# Patient Record
Sex: MALE | Race: WHITE | HISPANIC OR LATINO | Employment: FULL TIME | URBAN - METROPOLITAN AREA
[De-identification: names, ages, dates, MRNs, and addresses within clinical notes are randomized per-mention and may not be internally consistent; named-entity substitution may affect disease eponyms.]

---
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
## 2023-04-13 ENCOUNTER — HOSPITAL ENCOUNTER (EMERGENCY)
Facility: CLINIC | Age: 23
Discharge: HOME | End: 2023-04-13

## 2023-04-13 VITALS
RESPIRATION RATE: 16 BRPM | SYSTOLIC BLOOD PRESSURE: 141 MMHG | OXYGEN SATURATION: 98 % | TEMPERATURE: 98.4 F | WEIGHT: 220 LBS | HEART RATE: 81 BPM | DIASTOLIC BLOOD PRESSURE: 74 MMHG | HEIGHT: 65 IN | BODY MASS INDEX: 36.65 KG/M2

## 2023-04-13 DIAGNOSIS — S09.90XA INJURY OF HEAD, INITIAL ENCOUNTER: ICD-10-CM

## 2023-04-13 DIAGNOSIS — Z23 ENCOUNTER FOR IMMUNIZATION: ICD-10-CM

## 2023-04-13 DIAGNOSIS — S01.01XA LACERATION OF SCALP, INITIAL ENCOUNTER: ICD-10-CM

## 2023-04-13 PROCEDURE — 12001 RPR S/N/AX/GEN/TRNK 2.5CM/<: CPT

## 2023-04-13 PROCEDURE — 99283 EMERGENCY DEPT VISIT LOW MDM: CPT | Mod: 25

## 2023-04-13 PROCEDURE — 90471 IMMUNIZATION ADMIN: CPT | Performed by: EMERGENCY MEDICINE

## 2023-04-13 PROCEDURE — 250N000011 HC RX IP 250 OP 636: Performed by: EMERGENCY MEDICINE

## 2023-04-13 PROCEDURE — 90715 TDAP VACCINE 7 YRS/> IM: CPT | Performed by: EMERGENCY MEDICINE

## 2023-04-13 RX ADMIN — CLOSTRIDIUM TETANI TOXOID ANTIGEN (FORMALDEHYDE INACTIVATED), CORYNEBACTERIUM DIPHTHERIAE TOXOID ANTIGEN (FORMALDEHYDE INACTIVATED), BORDETELLA PERTUSSIS TOXOID ANTIGEN (GLUTARALDEHYDE INACTIVATED), BORDETELLA PERTUSSIS FILAMENTOUS HEMAGGLUTININ ANTIGEN (FORMALDEHYDE INACTIVATED), BORDETELLA PERTUSSIS PERTACTIN ANTIGEN, AND BORDETELLA PERTUSSIS FIMBRIAE 2/3 ANTIGEN 0.5 ML: 5; 2; 2.5; 5; 3; 5 INJECTION, SUSPENSION INTRAMUSCULAR at 16:35

## 2023-04-13 ASSESSMENT — ACTIVITIES OF DAILY LIVING (ADL): ADLS_ACUITY_SCORE: 35

## 2023-04-13 NOTE — ED TRIAGE NOTES
Hit head at work/laceration      Triage Assessment     Row Name 04/13/23 1531       Triage Assessment (Adult)    Airway WDL WDL       Respiratory WDL    Respiratory WDL WDL       Skin Circulation/Temperature WDL    Skin Circulation/Temperature WDL WDL       Cardiac WDL    Cardiac WDL WDL       Peripheral/Neurovascular WDL    Peripheral Neurovascular WDL WDL       Cognitive/Neuro/Behavioral WDL    Cognitive/Neuro/Behavioral WDL WDL

## 2023-04-13 NOTE — ED NOTES
Rapid Assessment Note    History:   Orlando Alarcon is a 22 year old male presenting after a head injury with associated pain wherein he was pushing a fence post when the top bar fell, causing the post to hit his head. The patient did not experience syncope. Denies neck pain or any other pain. He is not anticoagulated. Last tetanus shot was 1/14/2013.    Exam:   Constitutional:       Appearance: Normal appearance.      General: Not in acute distress.  HENT:      Head: Left frontal linear scalp laceration.  Roughly 3 cm in length.  No zacarias sign.  No raccoon's eyes.  No septal hematoma.  No facial tenderness to palpation.  No mastoid tenderness to palpation.  Normal dentition.  No tongue laceration.  Eyes:      Extraocular Movements: Extraocular movements intact.      Conjunctiva/sclera: Conjunctivae normal.   Cardiovascular:      Rate and Rhythm: Normal rate and regular rhythm.   Pulmonary:      Effort: Pulmonary effort is normal. No respiratory distress.   Abdominal:      General: Abdomen is flat. There is no distension.   Musculoskeletal:         General: No swelling or deformity.      Cervical back: Normal range of motion. No rigidity.  No midline cervical spine tenderness to palpation.  Skin:     Coloration: Skin is not jaundiced or pale.   Neurological:      General: No focal deficit present.  Even  strength bilaterally.     Mental Status: Alert and oriented to person, place, and time.   Psychiatric:         Mood and Affect: Mood normal.         Behavior: Behavior normal.            Plan of Care:   I evaluated the patient and developed an initial plan of care. I discussed this plan and explained that I, or one of my partners, would be returning to complete the evaluation.     I, Alison Hired, am serving as a scribe to document services personally performed by Jarrett Wiggins DO, based on my observations and the provider's statements to me.    11/5/2018  [employer]    Portions of this medical record were completed by a scribe. UPON MY REVIEW AND AUTHENTICATION BY ELECTRONIC SIGNATURE, this confirms (a) I performed the applicable clinical services, and (b) the record is accurate.      Jarrett Wiggins DO  04/13/23 1527

## 2023-04-13 NOTE — ED PROVIDER NOTES
"  History     Chief Complaint:  Head Injury     HPI   Orlando Alarcon is a 22 year old male who presents following head injury. He explains that today around 1330 while at work, he was struck by a falling fence post. He denies losing consciousness as a result. No headache, visual disturbance, nausea, vomiting, or neck pain.     Independent Historian:   None - Patient Only    Review of External Notes: I reviewed patients [facility] and noted their last TDaP administration on 1/14/2023    ROS:  Review of Systems   A comprehensive ROS was obtained and is negative aside from those called out in the HPI above.    Allergies:  The patient has no known allergies to medications.     Medications:    The patient is currently on no regular medications.    Past Medical History:    Histoplasmosis    Past Surgical History:    Bronchoscopy flexible, child     Family History:    Patients family history is not on file.    Social History:  Patient came from work.  Patient is unaccompanied in the ED.  PCP: Molly Gotti     Physical Exam     Patient Vitals for the past 24 hrs:   BP Temp Temp src Pulse Resp SpO2 Height Weight   04/13/23 1521 (!) 141/74 98.4  F (36.9  C) Oral 81 16 98 % 1.651 m (5' 5\") 99.8 kg (220 lb)        Physical Exam  General: Pleasant and well-appearing young adult male sitting in exam room  HENT:   Ears: No hemotympanum.  Head: No raccoon eyes or zacarias signs.  Patient has a 7 cm scalp laceration to the midline of his scalp.  Most of this is very superficial, however there is a small 2 cm area of gaping.  No evidence of foreign body.  Mouth/Throat: Oropharynx clear and moist.  Eyes: Conjunctive and EOM normal. PERRLA.  Neck: Normal ROM. No rigidity.  No midline C-spine tenderness  Resp: Normal respiratory effort.   MSK: Normal range of motion.  Skin: Warm and dry.  See above.  Neuro: Awake, alert, oriented x 3.  GCS 15.  Psych: Normal mood and affect.    Emergency Department Course         Procedures     " Laceration Repair      Procedure: Laceration Repair    Indication: Laceration    Consent: Verbal    Location: Scalp, center    Length: 7 cm total, 5cm is superficial not needing closure. 2cm is gaping and needing staples.    Preparation: Irrigation with Sterile Saline and Pressure device utilized.    Anesthesia/Sedation: None      Treatment/Exploration: Wound explored, no foreign bodies found     Closure: The wound was closed with four staples.    Patient Status: The patient tolerated the procedure well: Yes. There were no complications    Emergency Department Course & Assessments:       Interventions:  Medications   Tdap (tetanus-diphtheria-acell pertussis) (ADACEL) injection 0.5 mL (0.5 mLs Intramuscular $Given 4/13/23 1635)        Assessments:  1630 I obtained history and examined the patient as noted above.  1658 I rechecked the patient and explained findings.  1702 I performed the scalp laceration repair as noted above.      Social Determinants of Health affecting care:   None    Disposition:  The patient was discharged to home.     Impression & Plan      Medical Decision Making:  Orlando is a remarkably pleasant 22-year-old male who got hit in the head with a fence post prior to arrival at work and incurred a scalp laceration to the center of his scalp.  He explains this is not a severe mechanism.  He did not lose consciousness.  There is no headache, nausea, vomiting, visual changes, dizziness, confusion or trouble with coordination.  No indication for advanced imaging.  He did not incur any neck injury and C-spine was cleared clinically.  Tetanus was updated here today since his last tetanus was 2013 on chart review.  Wound was cleaned out copiously and explored.  There was no evidence for foreign body.  He had a 7 cm scalp laceration.  Most of this was actually superficial and did not require closure.  There was one 2 cm or so area of gaping that required for staples to be placed.  Wound was then dressed and  bacitracin applied.  He will follow-up with his PCP for staple removal in 5 days.  Wound care instructions discussed.  Also discussed head injury precautions and educational handout was provided.  Patient was discharged home in improved condition and GCS 15.    Diagnosis:    ICD-10-CM    1. Injury of head, initial encounter  S09.90XA       2. Laceration of scalp, initial encounter  S01.01XA       3. Encounter for immunization  Z23     Tetanus updated 4/12/23           Scribe Disclosure:  I, CHAPO LIMA, am serving as a scribe at 3:53 PM on 4/13/2023 to document services personally performed by Priscilla Hayes PA-C based on my observations and the provider's statements to me.        Priscilla Hayes PA-C  04/13/23 [id number]

## 2023-04-18 ENCOUNTER — HOSPITAL ENCOUNTER (EMERGENCY)
Facility: CLINIC | Age: 23
Discharge: HOME | End: 2023-04-18

## 2023-04-18 ASSESSMENT — ACTIVITIES OF DAILY LIVING (ADL)
ADLS_ACUITY_SCORE: 33